# Patient Record
Sex: FEMALE | Race: WHITE | NOT HISPANIC OR LATINO | URBAN - NONMETROPOLITAN AREA
[De-identification: names, ages, dates, MRNs, and addresses within clinical notes are randomized per-mention and may not be internally consistent; named-entity substitution may affect disease eponyms.]

---

## 2019-05-08 ENCOUNTER — IMPORTED ENCOUNTER (OUTPATIENT)
Dept: URBAN - NONMETROPOLITAN AREA CLINIC 1 | Facility: CLINIC | Age: 19
End: 2019-05-08

## 2019-05-08 PROCEDURE — 92014 COMPRE OPH EXAM EST PT 1/>: CPT

## 2019-05-08 PROCEDURE — 92310 CONTACT LENS FITTING OU: CPT

## 2019-05-08 PROCEDURE — 92015 DETERMINE REFRACTIVE STATE: CPT

## 2019-05-08 NOTE — PATIENT DISCUSSION
Myopia-Discussed diagnosis with patient. -Explained that people who are myopic are at a higher risk for developing RD/RT and reviewed associated S&S.-Pt to contact our office if symptoms develop. Updated spec Rx given. Updated CL Rx given.; 's Notes: OK TO FILL LOTEMAX

## 2020-08-07 ENCOUNTER — IMPORTED ENCOUNTER (OUTPATIENT)
Dept: URBAN - NONMETROPOLITAN AREA CLINIC 1 | Facility: CLINIC | Age: 20
End: 2020-08-07

## 2020-08-07 PROCEDURE — 92310 CONTACT LENS FITTING OU: CPT

## 2020-08-07 PROCEDURE — 92014 COMPRE OPH EXAM EST PT 1/>: CPT

## 2020-08-07 PROCEDURE — 92015 DETERMINE REFRACTIVE STATE: CPT

## 2020-08-07 NOTE — PATIENT DISCUSSION
Myopia-Discussed diagnosis with patient. -Explained that people who are myopic are at a higher risk for developing RD/RT and reviewed associated S&S.-Pt to contact our office if symptoms develop. CL wear-CLs fit and center well.-Stressed that patient should not sleep in CL. -Updated CL Rx given. -CL care and precautions given.; 's Notes: OK TO FILL LOTEMAX

## 2022-04-09 ASSESSMENT — VISUAL ACUITY
OS_SC: 20/25
OD_CC: J1
OD_SC: 20/25
OD_SC: 20/20
OS_SC: J1
OS_SC: 20/20
OS_SC: 20/20
OS_CC: J1
OD_SC: J1

## 2022-04-09 ASSESSMENT — TONOMETRY
OS_IOP_MMHG: 15
OD_IOP_MMHG: 15

## 2022-08-30 ENCOUNTER — ESTABLISHED PATIENT (OUTPATIENT)
Dept: RURAL CLINIC 1 | Facility: CLINIC | Age: 22
End: 2022-08-30

## 2022-08-30 DIAGNOSIS — H52.13: ICD-10-CM

## 2022-08-30 PROCEDURE — 92015 DETERMINE REFRACTIVE STATE: CPT

## 2022-08-30 PROCEDURE — 92014 COMPRE OPH EXAM EST PT 1/>: CPT

## 2022-08-30 PROCEDURE — 92310-E CONTACT LENS FITTING ESTABLISH PATIENT

## 2022-08-30 ASSESSMENT — VISUAL ACUITY
OD_CC: 20/25
OU_CC: 20/20
OU_CC: 20/20
OS_CC: 20/20
OD_CC: 20/20
OS_CC: 20/20

## 2022-08-30 ASSESSMENT — TONOMETRY
OD_IOP_MMHG: 15
OS_IOP_MMHG: 14

## 2023-07-05 ENCOUNTER — ESTABLISHED PATIENT (OUTPATIENT)
Dept: RURAL CLINIC 1 | Facility: CLINIC | Age: 23
End: 2023-07-05

## 2023-07-05 DIAGNOSIS — H52.13: ICD-10-CM

## 2023-07-05 PROCEDURE — 92310-E CONTACT LENS FITTING ESTABLISH PATIENT

## 2023-07-05 PROCEDURE — S0621 ROUTINE OPHTHALMOLOGICAL EXA: HCPCS

## 2023-07-05 ASSESSMENT — VISUAL ACUITY
OD_CC: 20/25
OS_CC: 20/30

## 2023-07-05 ASSESSMENT — TONOMETRY
OD_IOP_MMHG: 14
OS_IOP_MMHG: 14

## 2024-07-01 ENCOUNTER — ESTABLISHED PATIENT (OUTPATIENT)
Dept: RURAL CLINIC 1 | Facility: CLINIC | Age: 24
End: 2024-07-01

## 2024-07-01 DIAGNOSIS — H52.13: ICD-10-CM

## 2024-07-01 PROCEDURE — S0621AEC ROUTINE OPH EXAM INCLUDES REF/ EST PATIENT

## 2024-07-01 PROCEDURE — 92310-E CONTACT LENS FITTING ESTABLISH PATIENT

## 2024-07-01 ASSESSMENT — VISUAL ACUITY
OD_CC: 20/25
OS_CC: 20/20

## 2024-07-01 ASSESSMENT — TONOMETRY
OS_IOP_MMHG: 16
OD_IOP_MMHG: 16

## 2025-07-02 ENCOUNTER — COMPREHENSIVE EXAM (OUTPATIENT)
Age: 25
End: 2025-07-02

## 2025-07-02 DIAGNOSIS — H52.13: ICD-10-CM

## 2025-07-02 PROCEDURE — 92310-1 LEVEL 1 SOFT LENS UPDATE

## 2025-07-02 PROCEDURE — 92015 DETERMINE REFRACTIVE STATE: CPT

## 2025-07-02 PROCEDURE — 92014 COMPRE OPH EXAM EST PT 1/>: CPT
